# Patient Record
Sex: MALE | Race: BLACK OR AFRICAN AMERICAN | NOT HISPANIC OR LATINO | Employment: FULL TIME | URBAN - METROPOLITAN AREA
[De-identification: names, ages, dates, MRNs, and addresses within clinical notes are randomized per-mention and may not be internally consistent; named-entity substitution may affect disease eponyms.]

---

## 2020-03-10 ENCOUNTER — CONSULT (OUTPATIENT)
Dept: SLEEP MEDICINE | Facility: HOSPITAL | Age: 50
End: 2020-03-10

## 2020-03-10 VITALS
HEIGHT: 76 IN | DIASTOLIC BLOOD PRESSURE: 71 MMHG | SYSTOLIC BLOOD PRESSURE: 128 MMHG | WEIGHT: 287.4 LBS | HEART RATE: 84 BPM | OXYGEN SATURATION: 97 % | BODY MASS INDEX: 35 KG/M2

## 2020-03-10 DIAGNOSIS — E66.01 CLASS 2 SEVERE OBESITY DUE TO EXCESS CALORIES WITH SERIOUS COMORBIDITY AND BODY MASS INDEX (BMI) OF 35.0 TO 35.9 IN ADULT (HCC): ICD-10-CM

## 2020-03-10 DIAGNOSIS — G47.33 OSA (OBSTRUCTIVE SLEEP APNEA): Primary | ICD-10-CM

## 2020-03-10 PROCEDURE — 99203 OFFICE O/P NEW LOW 30 MIN: CPT | Performed by: INTERNAL MEDICINE

## 2020-03-10 RX ORDER — PRAVASTATIN SODIUM 40 MG
TABLET ORAL
COMMUNITY
Start: 2019-02-01

## 2020-03-10 RX ORDER — METFORMIN HYDROCHLORIDE 500 MG/1
TABLET, EXTENDED RELEASE ORAL
COMMUNITY
Start: 2020-02-10

## 2020-03-10 RX ORDER — LOSARTAN POTASSIUM 50 MG/1
TABLET ORAL DAILY
COMMUNITY
Start: 2019-02-01

## 2020-03-12 NOTE — PROGRESS NOTES
Subjective   Nikunj Real is a 49 y.o. male is being seen for consultation today at the request of Christiano Worthy MD further evaluation of snoring and obstructive sleep apnea.    History of Present Illness  Patient had a history of snoring noted since he was in his 20s.  He had a sleep study here in 2014 was diagnosed obstructive sleep apnea.  He has been on CPAP since then.  He says is working fairly well but has some buttons broken off and he is interested in getting a new machine.  He has not had new supplies recently.  He has mask leaks that bother him.  He says generally however when he uses the machine he feels rested.  He denies being sleepy during the day.  He denies having problems driving.    Without his mask he still has loud snoring and tosses and turns at night.  He snores in all positions.  He is sleepy during the day.  He has awaken gasping for breath.  He denies ever breaking his nose.  He denies any reflux symptoms.  He denies hypnagogue hallucinations sleep paralysis.  He kicks occasionally at night but says it does not keep him awake.  He denies having chronic pain that keeps him awake.  He denies any recent changes in his weight.    He goes to bed 8 30-9 p.m.  He will fall asleep in 15 to 20 minutes.  He awakens he thinks about 6 times during the night he gets about 6 and half hours sleep but still feels slightly tired even if he uses his machine.  He has a history of diabetes known for 4 years.  He denies any history of hypertension or coronary artery disease.  No Known Allergies       Current Outpatient Medications:   •  Dulaglutide (TRULICITY) 0.75 MG/0.5ML solution pen-injector, Inject  under the skin into the appropriate area as directed 1 (One) Time Per Week., Disp: , Rfl:   •  losartan (COZAAR) 50 MG tablet, Take  by mouth Daily., Disp: , Rfl:   •  metFORMIN ER (GLUCOPHAGE-XR) 500 MG 24 hr tablet, Take  by mouth., Disp: , Rfl:   •  pravastatin (PRAVACHOL) 40 MG tablet, Take  by mouth.,  "Disp: , Rfl:     Social History    Tobacco Use      Smoking status: Never Smoker      Smokeless tobacco: Never Used       Social History     Substance and Sexual Activity   Alcohol Use Not Currently    Comment: 12 beers a week       Caffeine: He has 2 cups coffee per day    Past Medical History:   Diagnosis Date   • Asthma    • Diabetes mellitus (CMS/Former[facility])        History reviewed. No pertinent surgical history.    Family History   Problem Relation Age of Onset   • Hypertension Mother    • Obesity Mother    Family history is also positive for asthma.    The following portions of the patient's history were reviewed and updated as appropriate: allergies, current medications, past family history, past medical history, past social history, past surgical history and problem list.    Review of Systems   Constitutional: Negative.    HENT: Negative.    Eyes: Positive for visual disturbance.   Respiratory: Positive for wheezing.    Cardiovascular: Negative.    Gastrointestinal: Negative.    Endocrine: Negative.    Genitourinary: Negative.    Musculoskeletal: Negative.    Skin: Negative.    Allergic/Immunologic: Negative.    Neurological: Negative.    Hematological: Negative.    Psychiatric/Behavioral: Negative.    [place] score is 6/24    Objective     /71   Pulse 84   Ht 193 cm (76\")   Wt 130 kg (287 lb 6.4 oz)   SpO2 97%   BMI 34.98 kg/m²      Physical Exam   Constitutional: He is oriented to person, place, and time. He appears well-developed and well-nourished.   He is obese.   HENT:   Head: Normocephalic and atraumatic.   He has Mallampati class III anatomy.   Eyes: Pupils are equal, round, and reactive to light. EOM are normal.   Neck: Normal range of motion. Neck supple.   Cardiovascular: Normal rate, regular rhythm and normal heart sounds.   Pulmonary/Chest: Effort normal and breath sounds normal.   Abdominal: Soft. Bowel sounds are normal.   Musculoskeletal: Normal range of motion. He exhibits no edema. "   Neurological: He is alert and oriented to person, place, and time.   Skin: Skin is warm and dry.   Psychiatric: He has a normal mood and affect. His behavior is normal.   He had a split-night sleep study on November 1, 2014.  He had an AHI of 91.  He titrated CPAP 11.    Download from his machine for the past 47 days shows he is only used it 21% of the time.  He is on an auto set of 12-20.  His AHI is 1.  His 90th percentile pressure is 13.4.    Assessment/Plan   Nikunj was seen today for sleeping problem.    Diagnoses and all orders for this visit:    MISSY (obstructive sleep apnea)  -     CPAP Therapy    Class 2 severe obesity due to excess calories with serious comorbidity and body mass index (BMI) of 35.0 to 35.9 in adult (CMS/Former[facility])    Patient has a history of severe obstructive sleep apnea.  He says his weight is about the same.  We discussed possible therapies including CPAP, weight control, oral appliance, and surgery.  He wishes to continue on CPAP.  We will order a new machine.  And would we will also order new supplies.  We will plan to see him back in 2 months.  We can download the machine make sure it is being effective.  He is encouraged to lose weight.  He is encouraged avoid alcohol and sedatives close to bedtime.  He is encouraged to practice lateral position sleep.         Nikhil Collado MD [facility]  Sleep Medicine  Pulmonary and Critical Care Medicine

## 2020-05-08 ENCOUNTER — HOSPITAL ENCOUNTER (EMERGENCY)
Facility: HOSPITAL | Age: 50
Discharge: HOME | End: 2020-05-08
Attending: EMERGENCY MEDICINE | Admitting: EMERGENCY MEDICINE

## 2020-05-08 ENCOUNTER — APPOINTMENT (OUTPATIENT)
Dept: GENERAL RADIOLOGY | Facility: HOSPITAL | Age: 50
End: 2020-05-08

## 2020-05-08 VITALS
RESPIRATION RATE: 18 BRPM | OXYGEN SATURATION: 97 % | BODY MASS INDEX: 36.04 KG/M2 | HEIGHT: 76 IN | HEART RATE: 78 BPM | TEMPERATURE: 98.1 F | SYSTOLIC BLOOD PRESSURE: 128 MMHG | DIASTOLIC BLOOD PRESSURE: 81 MMHG | WEIGHT: 296 LBS

## 2020-05-08 DIAGNOSIS — R20.2 PARESTHESIA OF LEFT UPPER EXTREMITY: Primary | ICD-10-CM

## 2020-05-08 DIAGNOSIS — Z91.89 MULTIPLE RISK FACTORS FOR CORONARY ARTERY DISEASE: ICD-10-CM

## 2020-05-08 DIAGNOSIS — K04.7 DENTAL ABSCESS: ICD-10-CM

## 2020-05-08 LAB
ALBUMIN SERPL-MCNC: 4.6 G/DL (ref 3.5–5.2)
ALBUMIN/GLOB SERPL: 1.5 G/DL
ALP SERPL-CCNC: 69 U/L (ref 39–117)
ALT SERPL W P-5'-P-CCNC: 26 U/L (ref 1–41)
ANION GAP SERPL CALCULATED.3IONS-SCNC: 14 MMOL/L (ref 5–15)
AST SERPL-CCNC: 24 U/L (ref 1–40)
BASOPHILS # BLD AUTO: 0.03 10*3/MM3 (ref 0–0.2)
BASOPHILS NFR BLD AUTO: 0.4 % (ref 0–1.5)
BILIRUB SERPL-MCNC: 0.4 MG/DL (ref 0.2–1.2)
BUN BLD-MCNC: 12 MG/DL (ref 6–20)
BUN/CREAT SERPL: 13.6 (ref 7–25)
CALCIUM SPEC-SCNC: 9.7 MG/DL (ref 8.6–10.5)
CHLORIDE SERPL-SCNC: 98 MMOL/L (ref 98–107)
CO2 SERPL-SCNC: 25 MMOL/L (ref 22–29)
CREAT BLD-MCNC: 0.88 MG/DL (ref 0.76–1.27)
DEPRECATED RDW RBC AUTO: 43.4 FL (ref 37–54)
EOSINOPHIL # BLD AUTO: 0.01 10*3/MM3 (ref 0–0.4)
EOSINOPHIL NFR BLD AUTO: 0.1 % (ref 0.3–6.2)
ERYTHROCYTE [DISTWIDTH] IN BLOOD BY AUTOMATED COUNT: 13.1 % (ref 12.3–15.4)
GFR SERPL CREATININE-BSD FRML MDRD: 111 ML/MIN/1.73
GLOBULIN UR ELPH-MCNC: 3 GM/DL
GLUCOSE BLD-MCNC: 110 MG/DL (ref 65–99)
HCT VFR BLD AUTO: 48 % (ref 37.5–51)
HGB BLD-MCNC: 15 G/DL (ref 13–17.7)
HOLD SPECIMEN: NORMAL
HOLD SPECIMEN: NORMAL
IMM GRANULOCYTES # BLD AUTO: 0.02 10*3/MM3 (ref 0–0.05)
IMM GRANULOCYTES NFR BLD AUTO: 0.3 % (ref 0–0.5)
LIPASE SERPL-CCNC: 23 U/L (ref 13–60)
LYMPHOCYTES # BLD AUTO: 1.14 10*3/MM3 (ref 0.7–3.1)
LYMPHOCYTES NFR BLD AUTO: 14.4 % (ref 19.6–45.3)
MCH RBC QN AUTO: 28.3 PG (ref 26.6–33)
MCHC RBC AUTO-ENTMCNC: 31.3 G/DL (ref 31.5–35.7)
MCV RBC AUTO: 90.6 FL (ref 79–97)
MONOCYTES # BLD AUTO: 0.39 10*3/MM3 (ref 0.1–0.9)
MONOCYTES NFR BLD AUTO: 4.9 % (ref 5–12)
NEUTROPHILS # BLD AUTO: 6.35 10*3/MM3 (ref 1.7–7)
NEUTROPHILS NFR BLD AUTO: 79.9 % (ref 42.7–76)
NRBC BLD AUTO-RTO: 0 /100 WBC (ref 0–0.2)
NT-PROBNP SERPL-MCNC: 24.5 PG/ML (ref 5–900)
PLATELET # BLD AUTO: 218 10*3/MM3 (ref 140–450)
PMV BLD AUTO: 11.1 FL (ref 6–12)
POTASSIUM BLD-SCNC: 5 MMOL/L (ref 3.5–5.2)
PROT SERPL-MCNC: 7.6 G/DL (ref 6–8.5)
RBC # BLD AUTO: 5.3 10*6/MM3 (ref 4.14–5.8)
SODIUM BLD-SCNC: 137 MMOL/L (ref 136–145)
TROPONIN T SERPL-MCNC: <0.01 NG/ML (ref 0–0.03)
TROPONIN T SERPL-MCNC: <0.01 NG/ML (ref 0–0.03)
WBC NRBC COR # BLD: 7.94 10*3/MM3 (ref 3.4–10.8)
WHOLE BLOOD HOLD SPECIMEN: NORMAL
WHOLE BLOOD HOLD SPECIMEN: NORMAL

## 2020-05-08 PROCEDURE — 99285 EMERGENCY DEPT VISIT HI MDM: CPT

## 2020-05-08 PROCEDURE — 93005 ELECTROCARDIOGRAM TRACING: CPT

## 2020-05-08 PROCEDURE — 84484 ASSAY OF TROPONIN QUANT: CPT | Performed by: EMERGENCY MEDICINE

## 2020-05-08 PROCEDURE — 93005 ELECTROCARDIOGRAM TRACING: CPT | Performed by: EMERGENCY MEDICINE

## 2020-05-08 PROCEDURE — 80053 COMPREHEN METABOLIC PANEL: CPT | Performed by: EMERGENCY MEDICINE

## 2020-05-08 PROCEDURE — 71045 X-RAY EXAM CHEST 1 VIEW: CPT

## 2020-05-08 PROCEDURE — 83690 ASSAY OF LIPASE: CPT | Performed by: EMERGENCY MEDICINE

## 2020-05-08 PROCEDURE — 85025 COMPLETE CBC W/AUTO DIFF WBC: CPT | Performed by: EMERGENCY MEDICINE

## 2020-05-08 PROCEDURE — 83880 ASSAY OF NATRIURETIC PEPTIDE: CPT | Performed by: EMERGENCY MEDICINE

## 2020-05-08 RX ORDER — SODIUM CHLORIDE 0.9 % (FLUSH) 0.9 %
10 SYRINGE (ML) INJECTION AS NEEDED
Status: DISCONTINUED | OUTPATIENT
Start: 2020-05-08 | End: 2020-05-08 | Stop reason: HOSPADM

## 2020-05-08 RX ORDER — AMOXICILLIN 875 MG/1
875 TABLET, COATED ORAL 2 TIMES DAILY
Qty: 20 TABLET | Refills: 0 | Status: SHIPPED | OUTPATIENT
Start: 2020-05-08

## 2020-05-08 RX ORDER — OXYCODONE AND ACETAMINOPHEN 7.5; 325 MG/1; MG/1
1 TABLET ORAL EVERY 6 HOURS PRN
Qty: 8 TABLET | Refills: 0 | Status: SHIPPED | OUTPATIENT
Start: 2020-05-08

## 2020-05-08 NOTE — DISCHARGE INSTRUCTIONS
If you take further ibuprofen make sure you take it on a full stomach.  If you take the pain medicine I have prescribed do not drive or work while doing so as it will make you sleepy.  Return if you develop shortness of breath, pain in your chest, or any other concerns.

## 2020-05-08 NOTE — ED PROVIDER NOTES
Subjective   Mr. Real presents from work with the complaint of right-sided jaw pain and tingling in his left arm.  He tells me that he has had a sore tooth for the last several days on the right lower jaw.  He has been taking 800 mg of ibuprofen intermittently for it.  He tells me the pain is getting worse and he did not sleep any last night.  He did not eat any breakfast and was tired on the way to work so drank coffee on the way to work.  Upon arriving to work he tells me he felt very jittery and became diaphoretic and noticed tingly sensations in his left forearm.  He denies any shortness of breath or pain in his chest.  He has no known cardiac history.  Family history is negative for heart disease in any first-degree relatives.  He does have obstructive sleep apnea but is having trouble with leakage around his mask.  He went to the clinic at [facility] and was referred here for further evaluation.      History provided by:  Patient  Chest Pain   Chest pain location: Right jaw.  Pain quality: aching    Pain radiates to:  Does not radiate  Pain severity:  Moderate (Was severe last night)  Onset quality:  Gradual  Timing:  Constant  Progression:  Worsening  Chronicity:  New  Relieved by: Rinsing his mouth with mouthwash.  Worsened by:  Nothing  Ineffective treatments: 800 mg of ibuprofen.  Associated symptoms: diaphoresis and numbness    Associated symptoms: no abdominal pain, no cough, no dizziness, no fatigue, no fever, no nausea, no palpitations, no shortness of breath, no vomiting and no weakness    Risk factors: diabetes mellitus, high cholesterol and hypertension        Review of Systems   Constitutional: Positive for diaphoresis. Negative for chills, fatigue and fever.   HENT: Positive for dental problem. Negative for congestion and rhinorrhea.    Respiratory: Negative for cough and shortness of breath.    Cardiovascular: Positive for chest pain. Negative for palpitations.   Gastrointestinal: Negative for  abdominal pain, nausea and vomiting.   Neurological: Positive for numbness. Negative for dizziness and weakness.   All other systems reviewed and are negative.      Past Medical History:   Diagnosis Date   • Asthma    • Diabetes mellitus (CMS/HCC)    • Hypertension        No Known Allergies    History reviewed. No pertinent surgical history.    Family History   Problem Relation Age of Onset   • Hypertension Mother    • Obesity Mother        Social History     Socioeconomic History   • Marital status:      Spouse name: Not on file   • Number of children: Not on file   • Years of education: Not on file   • Highest education level: Not on file   Tobacco Use   • Smoking status: Never Smoker   • Smokeless tobacco: Never Used   Substance and Sexual Activity   • Alcohol use: Not Currently     Comment: 12 beers a week   • Drug use: Never   • Sexual activity: Defer           Objective   Physical Exam   Constitutional: He is oriented to person, place, and time. He appears well-developed and well-nourished. No distress.   He is a very pleasant gentleman   HENT:   Mouth/Throat: Oropharynx is clear and moist.   What looks to be tooth #30 has a cap on it and the gumline around that tooth is swollen and tender.  There is no palpable abscess however.   Eyes: Conjunctivae are normal. No scleral icterus.   Neck: Normal range of motion. Neck supple. No JVD present.   Cardiovascular: Normal rate and regular rhythm. Exam reveals no friction rub.   No murmur heard.  Pulmonary/Chest: Effort normal and breath sounds normal. No respiratory distress. He has no wheezes.   Abdominal: Soft. Bowel sounds are normal. He exhibits no distension. There is no guarding.   He is mildly tender in the right upper quadrant   Musculoskeletal: Normal range of motion. He exhibits no edema or tenderness.   Neurological: He is alert and oriented to person, place, and time. Coordination normal.   Skin: Skin is warm and dry. No rash noted. No pallor.    Psychiatric: He has a normal mood and affect. His behavior is normal.   Nursing note and vitals reviewed.      Procedures           ED Course  ED Course as of May 08 1507   Fri May 08, 2020   1339 I spoke with Mr. Real about findings.  And troponin is negative.  He does have a lot of risk factors so will refer him for stress testing and the chest pain clinic.  He tells me his tooth is really bothering him.  We will place him on antibiotics and oxycodone.  I discussed use of narcotic pain medications with him.  He does have a dentist and will call them and arrange close follow-up    [DT]      ED Course User Index  [DT] Sha Viramontes MD                                           MDM  Number of Diagnoses or Management Options  Dental abscess: established and worsening  Multiple risk factors for coronary artery disease:   Paresthesia of left upper extremity: new and requires workup     Amount and/or Complexity of Data Reviewed  Clinical lab tests: reviewed and ordered  Tests in the radiology section of CPT®: ordered and reviewed  Independent visualization of images, tracings, or specimens: yes        Final diagnoses:   Dental abscess   Paresthesia of left upper extremity   Multiple risk factors for coronary artery disease            Sha Viramontes MD  05/08/20 9024

## 2020-05-11 ENCOUNTER — TELEMEDICINE (OUTPATIENT)
Dept: CARDIOLOGY | Facility: HOSPITAL | Age: 50
End: 2020-05-11

## 2020-05-11 VITALS — WEIGHT: 296 LBS | HEIGHT: 76 IN | BODY MASS INDEX: 36.04 KG/M2

## 2020-05-11 DIAGNOSIS — I10 ESSENTIAL HYPERTENSION: Primary | ICD-10-CM

## 2020-05-11 DIAGNOSIS — E11.9 DIABETES MELLITUS WITH NO COMPLICATION (HCC): ICD-10-CM

## 2020-05-11 DIAGNOSIS — E78.5 HYPERLIPIDEMIA, UNSPECIFIED HYPERLIPIDEMIA TYPE: ICD-10-CM

## 2020-05-11 PROCEDURE — 99213 OFFICE O/P EST LOW 20 MIN: CPT | Performed by: NURSE PRACTITIONER

## 2020-05-11 NOTE — PROGRESS NOTES
[facility]  Heart and Valve Center  Telemedicine note    05/11/2020         Nikunj Real  [address] [facility] [place] [address]  [unfilled]    1970    Christiano Worthy MD    Nikunj Real is a 50 y.o. male.      Subjective:     Chief Complaint:  Establish Care (left arm numbness and tingling)   n/t left arm     This was an telephone enabled telemedicine encounter.  Unable to complete visit using a video connection to the patient. A phone visit was used to complete this visits. Total time of discussion was 12 minutes.    HPI 50-year-old male presented to the ED on 5/8/2020 with complaints of right-sided jaw pain and tingling in his left arm.  Patient notes he has had a sore tooth patient reports he had been taking in 100 mg of ibuprofen in the right lower jaw for the last few days.  And the pain continued to worsen.  Patient did not sleep the night prior to ED visit and did not eat breakfast prior to going to work.  Patient drank coffee on the way to work and then noted he experienced jitteriness diaphoresis and tingling in his left forearm.  He denied any shortness of breath or pain in his chest.  He has no known cardiac history.  History of hypertension, hyperlipidemia and diabetes all well controlled on medication.  Family history is negative for heart disease in a first-degree relative.  Does have a history of obstructive sleep apnea and notes leakage around his mask.  He also denies abdominal pain, cough, dizziness, fatigue, presyncope, syncope, orthopnea, PND or pedal edema.  EKGs x2 were normal sinus rhythm, troponin within normal limits.      Patient Active Problem List   Diagnosis   • Diabetes mellitus with no complication (CMS/[facility])   • MISSY (obstructive sleep apnea)   • Class 2 severe obesity due to excess calories with serious comorbidity and body mass index (BMI) of 35.0 to 35.9 in adult (CMS/[facility])       Past Medical History:   Diagnosis Date   • Asthma    • Diabetes mellitus  (CMS/[facility])    • Hypertension        History reviewed. No pertinent surgical history.    Family History   Problem Relation Age of Onset   • Hypertension Mother    • Obesity Mother    • Other Brother         enlarged heart       Social History     Socioeconomic History   • Marital status:      Spouse name: Not on file   • Number of children: Not on file   • Years of education: Not on file   • Highest education level: Not on file   Tobacco Use   • Smoking status: Never Smoker   • Smokeless tobacco: Never Used   Substance and Sexual Activity   • Alcohol use: Not Currently     Comment: 12 beers a week   • Drug use: Never   • Sexual activity: Defer   Social History Narrative    caffeine use: 1 serving of coffee daily       No Known Allergies      Current Outpatient Medications:   •  amoxicillin (AMOXIL) 875 MG tablet, Take 1 tablet by mouth 2 (Two) Times a Day., Disp: 20 tablet, Rfl: 0  •  Dulaglutide (TRULICITY) 0.75 MG/0.5ML solution pen-injector, Inject  under the skin into the appropriate area as directed 1 (One) Time Per Week., Disp: , Rfl:   •  losartan (COZAAR) 50 MG tablet, Take  by mouth Daily., Disp: , Rfl:   •  metFORMIN ER (GLUCOPHAGE-XR) 500 MG 24 hr tablet, Take  by mouth., Disp: , Rfl:   •  oxyCODONE-acetaminophen (PERCOCET) 7.5-325 MG per tablet, Take 1 tablet by mouth Every 6 (Six) Hours As Needed (Pain)., Disp: 8 tablet, Rfl: 0  •  pravastatin (PRAVACHOL) 40 MG tablet, Take  by mouth., Disp: , Rfl:     The following portions of the patient's history were reviewed today and updated as appropriate: allergies, current medications, past family history, past medical history, past social history, past surgical history and problem list     Review of Systems   Constitution: Negative for chills, decreased appetite, diaphoresis, fever, malaise/fatigue, night sweats, weight gain and weight loss.   HENT: Negative for congestion, hearing loss, hoarse voice and nosebleeds.         Right jaw pain  Right tooth  "pain    Eyes: Negative for blurred vision, visual disturbance and visual halos.   Cardiovascular: Negative for chest pain, claudication, cyanosis, dyspnea on exertion, irregular heartbeat, leg swelling, near-syncope, orthopnea, palpitations, paroxysmal nocturnal dyspnea and syncope.   Respiratory: Negative for cough, hemoptysis, shortness of breath, sleep disturbances due to breathing, snoring, sputum production and wheezing.    Hematologic/Lymphatic: Negative for bleeding problem. Does not bruise/bleed easily.   Skin: Negative for dry skin, itching and rash.   Musculoskeletal: Negative for arthritis, falls, joint pain, joint swelling and myalgias.   Gastrointestinal: Negative for bloating, abdominal pain, constipation, diarrhea, flatus, heartburn, hematemesis, hematochezia, melena, nausea and vomiting.   Genitourinary: Negative for dysuria, frequency, hematuria, nocturia and urgency.   Neurological: Negative for excessive daytime sleepiness, dizziness, headaches, light-headedness, loss of balance and weakness.   Psychiatric/Behavioral: Negative for depression. The patient does not have insomnia and is not nervous/anxious.        Objective:     Vitals:    05/11/20 1027   Weight: 134 kg (296 lb)   Height: 193 cm (76\")       Body mass index is 36.03 kg/m².    Physical Exam   Constitutional: He is oriented to person, place, and time.   Pulmonary/Chest: Effort normal.   Neurological: He is alert and oriented to person, place, and time.   Psychiatric: His behavior is normal. Thought content normal.   Nursing note and vitals reviewed.      Lab and Diagnostic Review:  Results for orders placed or performed during the hospital encounter of 05/08/20   Troponin   Result Value Ref Range    Troponin T <0.010 0.000 - 0.030 ng/mL   Comprehensive Metabolic Panel   Result Value Ref Range    Glucose 110 (H) 65 - 99 mg/dL    BUN 12 6 - 20 mg/dL    Creatinine 0.88 0.76 - 1.27 mg/dL    Sodium 137 136 - 145 mmol/L    Potassium 5.0 3.5 - " 5.2 mmol/L    Chloride 98 98 - 107 mmol/L    CO2 25.0 22.0 - 29.0 mmol/L    Calcium 9.7 8.6 - 10.5 mg/dL    Total Protein 7.6 6.0 - 8.5 g/dL    Albumin 4.60 3.50 - 5.20 g/dL    ALT (SGPT) 26 1 - 41 U/L    AST (SGOT) 24 1 - 40 U/L    Alkaline Phosphatase 69 39 - 117 U/L    Total Bilirubin 0.4 0.2 - 1.2 mg/dL    eGFR  African Amer 111 >60 mL/min/1.73    Globulin 3.0 gm/dL    A/G Ratio 1.5 g/dL    BUN/Creatinine Ratio 13.6 7.0 - 25.0    Anion Gap 14.0 5.0 - 15.0 mmol/L   Lipase   Result Value Ref Range    Lipase 23 13 - 60 U/L   BNP   Result Value Ref Range    proBNP 24.5 5.0 - 900.0 pg/mL   CBC Auto Differential   Result Value Ref Range    WBC 7.94 3.40 - 10.80 10*3/mm3    RBC 5.30 4.14 - 5.80 10*6/mm3    Hemoglobin 15.0 13.0 - 17.7 g/dL    Hematocrit 48.0 37.5 - 51.0 %    MCV 90.6 79.0 - 97.0 fL    MCH 28.3 26.6 - 33.0 pg    MCHC 31.3 (L) 31.5 - 35.7 g/dL    RDW 13.1 12.3 - 15.4 %    RDW-SD 43.4 37.0 - 54.0 fl    MPV 11.1 6.0 - 12.0 fL    Platelets 218 140 - 450 10*3/mm3    Neutrophil % 79.9 (H) 42.7 - 76.0 %    Lymphocyte % 14.4 (L) 19.6 - 45.3 %    Monocyte % 4.9 (L) 5.0 - 12.0 %    Eosinophil % 0.1 (L) 0.3 - 6.2 %    Basophil % 0.4 0.0 - 1.5 %    Immature Grans % 0.3 0.0 - 0.5 %    Neutrophils, Absolute 6.35 1.70 - 7.00 10*3/mm3    Lymphocytes, Absolute 1.14 0.70 - 3.10 10*3/mm3    Monocytes, Absolute 0.39 0.10 - 0.90 10*3/mm3    Eosinophils, Absolute 0.01 0.00 - 0.40 10*3/mm3    Basophils, Absolute 0.03 0.00 - 0.20 10*3/mm3    Immature Grans, Absolute 0.02 0.00 - 0.05 10*3/mm3    nRBC 0.0 0.0 - 0.2 /100 WBC   Troponin   Result Value Ref Range    Troponin T <0.010 0.000 - 0.030 ng/mL     EKG NSR x2     Assessment and Plan:   1. Essential hypertension  Well controlled per patient report on losartan  HTN Education provided today including signs and symptoms, medication management, daily blood pressure monitoring. Patient encouraged to call the Heart and Valve center with any abnormal readings.     2. Hyperlipidemia,  unspecified hyperlipidemia type  Statin therapy   3. Diabetes mellitus with no complication (CMS/HCC)  Well controlled on metformin and trulicity     Discussion with patient regarding stress test . Patient would like to defer stress testing at this time due to atypical symptoms. Encouraged patient to call office if he would begin experiencing chest pain/Dyspnea  This visit has been scheduled as a telephone  visit to comply with patient safety concerns in accordance with CDC recommendations. Total time of discussion was 12 minutes.      It has been a pleasure to participate in the care of this patient.  Patient was instructed to call the Heart and Valve Center with any questions, concerns, or worsening symptoms.    *Please note that portions of this note were completed with a voice recognition program. Efforts were made to edit the dictations, but occasionally words are mistranscribed.

## 2024-01-15 ENCOUNTER — TELEPHONE (OUTPATIENT)
Dept: UROLOGY | Facility: CLINIC | Age: 54
End: 2024-01-15
Payer: COMMERCIAL

## 2024-02-27 ENCOUNTER — OFFICE VISIT (OUTPATIENT)
Dept: UROLOGY | Facility: CLINIC | Age: 54
End: 2024-02-27
Payer: COMMERCIAL

## 2024-02-27 VITALS — HEIGHT: 76 IN | BODY MASS INDEX: 33.24 KG/M2 | WEIGHT: 273 LBS | HEART RATE: 64 BPM | OXYGEN SATURATION: 97 %

## 2024-02-27 DIAGNOSIS — Z30.09 UNWANTED FERTILITY: Primary | ICD-10-CM

## 2024-02-27 RX ORDER — TAMSULOSIN HYDROCHLORIDE 0.4 MG/1
1 CAPSULE ORAL DAILY
COMMUNITY

## 2024-02-27 RX ORDER — TIRZEPATIDE 15 MG/.5ML
INJECTION, SOLUTION SUBCUTANEOUS
COMMUNITY
Start: 2024-02-26

## 2024-02-27 RX ORDER — ROSUVASTATIN CALCIUM 40 MG/1
TABLET, COATED ORAL
COMMUNITY
Start: 2024-02-12

## 2024-02-27 NOTE — PROGRESS NOTES
Office Note Vasectomy Consult     Patient Name: Nikunj Real  : 1970   MRN: [id number]     Chief Complaint:  Elective Sterilization.       History of Present Illness: Nikunj Real is a 53 y.o. male who presents to Urology today with the desire for irreversible, elective sterilization.  The patient reports that he is  with biological children.  He reports that he and his spouse have been considering vasectomy.  He denies any lower urinary tract symptoms.  Denies hematuria.  Denies history of urinary tract infection. Denies prior urologic evaluation or instrumentation.      Subjective      Review of Systems: Review of Systems   Genitourinary:  Negative for decreased urine volume, difficulty urinating, dysuria, enuresis, flank pain, frequency, hematuria and urgency.        I have reviewed the ROS documented by my clinical staff, I have updated appropriately and I agree. Ez Farrar MD    Past Medical History:   Past Medical History:   Diagnosis Date    Asthma     Diabetes mellitus     Hypertension        Past Surgical History: History reviewed. No pertinent surgical history.    Family History:   Family History   Problem Relation Age of Onset    Hypertension Mother     Obesity Mother     Other Brother         enlarged heart       Social History:   Social History     Socioeconomic History    Marital status:    Tobacco Use    Smoking status: Never     Passive exposure: Never    Smokeless tobacco: Never   Vaping Use    Vaping Use: Never used   Substance and Sexual Activity    Alcohol use: Not Currently     Comment: 12 beers a week    Drug use: Never    Sexual activity: Defer       Medications:     Current Outpatient Medications:     losartan (COZAAR) 50 MG tablet, Take  by mouth Daily., Disp: , Rfl:     metFORMIN ER (GLUCOPHAGE-XR) 500 MG 24 hr tablet, Take  by mouth., Disp: , Rfl:     Mounjaro 15 MG/0.5ML solution pen-injector pen, , Disp: , Rfl:     rosuvastatin (CRESTOR) 40 MG tablet,  "TAKE 1 TABLET BY MOUTH AT BEDTIME FOR HIGH CHOLESTEROL, Disp: , Rfl:     tamsulosin (FLOMAX) 0.4 MG capsule 24 hr capsule, Take 1 capsule by mouth Daily., Disp: , Rfl:     amoxicillin (AMOXIL) 875 MG tablet, Take 1 tablet by mouth 2 (Two) Times a Day. (Patient not taking: Reported on 2/27/2024), Disp: 20 tablet, Rfl: 0    Dulaglutide (TRULICITY) 0.75 MG/0.5ML solution pen-injector, Inject  under the skin into the appropriate area as directed 1 (One) Time Per Week. (Patient not taking: Reported on 2/27/2024), Disp: , Rfl:     oxyCODONE-acetaminophen (PERCOCET) 7.5-325 MG per tablet, Take 1 tablet by mouth Every 6 (Six) Hours As Needed (Pain). (Patient not taking: Reported on 2/27/2024), Disp: 8 tablet, Rfl: 0    pravastatin (PRAVACHOL) 40 MG tablet, Take  by mouth. (Patient not taking: Reported on 2/27/2024), Disp: , Rfl:     Allergies:   No Known Allergies      Objective     Physical Exam:   Vital Signs:   Vitals:    02/27/24 1507   Pulse: 64   SpO2: 97%   Weight: 124 kg (273 lb)   Height: 193 cm (76\")   PainSc: 0-No pain     Body mass index is 33.23 kg/m².     Physical Exam  Vitals and nursing note reviewed.   Constitutional:       Appearance: Normal appearance.   HENT:      Head: Normocephalic and atraumatic.   Cardiovascular:      Comments: Well perfused  Pulmonary:      Effort: Pulmonary effort is normal.   Abdominal:      General: Abdomen is flat.      Palpations: Abdomen is soft.   Musculoskeletal:         General: Normal range of motion.   Skin:     General: Skin is warm and dry.   Neurological:      General: No focal deficit present.      Mental Status: He is alert and oriented to person, place, and time. Mental status is at baseline.   Psychiatric:         Mood and Affect: Mood normal.         Behavior: Behavior normal.         Thought Content: Thought content normal.         Judgment: Judgment normal.         Genitourinary  Penis: uncircumcised penis, glans normal, no penile discharge.  No rashes/lesions.  "   Testes: descended bilaterally, no masses, nontender to palpation.  Bilateral vas deferens palpable. Remainder of scrotal contents normal. No hernia appreciated.      Labs:   Brief Urine Lab Results       None                 Lab Results   Component Value Date    GLUCOSE 110 (H) 05/08/2020    CALCIUM 9.7 05/08/2020     05/08/2020    K 5.0 05/08/2020    CO2 25.0 05/08/2020    CL 98 05/08/2020    BUN 12 05/08/2020    CREATININE 0.88 05/08/2020    EGFRIFAFRI 111 05/08/2020    BCR 13.6 05/08/2020    ANIONGAP 14.0 05/08/2020       Lab Results   Component Value Date    WBC 7.94 05/08/2020    HGB 15.0 05/08/2020    HCT 48.0 05/08/2020    MCV 90.6 05/08/2020     05/08/2020       Images:   No Images in the past 120 days found..    Measures:   Tobacco:   Nikunj Real  reports that he has never smoked. He has never been exposed to tobacco smoke. He has never used smokeless tobacco.. I have educated him on the risk of diseases from using tobacco products.    Assessment / Plan      Assessment/Plan:   Mr. Real is a 53 y.o. male who presents today requesting permanent, irreversible surgical sterilization.     Diagnoses and all orders for this visit:    1. Unwanted fertility (Primary)  -     External Facility Surgical/Procedural Request; Future         Patient Education:   Today we discussed the risks and benefits of irreversible and permanent surgical sterilization.  The vasectomy procedure was discussed in detail with the patient. Risks including failure of the procedure, infection, bleeding, development of chronic testicular pain, and the possibility of injuring adjacent structures which could potentially result in loss of the testicle were discussed in depth..  Furthermore, the patient was told he would remain fertile following the procedure until he provided a semen analysis after 12 weeks and 20 ejaculations post-procedure. Discussed that semen analysis will be reviewed and he will be contacted with results.  The patient is understanding that any unprotected intercourse can result in pregnancy until he provides a semen analysis at above interval.  He is understanding that he requires birth control method until his semen analysis is performed and completed.The patient expressed understanding and desire to proceed.  He will be scheduled for vasectomy at his convenience.       I spent approximately 30 minutes providing clinical care for this patient; including review of patient's chart and provider documentation, face to face time spent with patient in examination room (obtaining history, performing physical exam, discussing diagnosis and management options), placing orders, and completing patient documentation.     Ez Farrar MD  [facility] [place]

## 2024-04-16 ENCOUNTER — LAB REQUISITION (OUTPATIENT)
Dept: LAB | Facility: HOSPITAL | Age: 54
End: 2024-04-16

## 2024-04-16 ENCOUNTER — DOCUMENTATION (OUTPATIENT)
Dept: UROLOGY | Facility: CLINIC | Age: 54
End: 2024-04-16

## 2024-04-16 ENCOUNTER — OUTSIDE FACILITY SERVICE (OUTPATIENT)
Dept: UROLOGY | Facility: CLINIC | Age: 54
End: 2024-04-16
Payer: COMMERCIAL

## 2024-04-16 DIAGNOSIS — Z30.09 ENCOUNTER FOR OTHER GENERAL COUNSELING AND ADVICE ON CONTRACEPTION: ICD-10-CM

## 2024-04-16 PROCEDURE — 88302 TISSUE EXAM BY PATHOLOGIST: CPT | Performed by: UROLOGY

## 2024-04-16 NOTE — PROGRESS NOTES
[facility]         OPERATIVE REPORT - VASECTOMY    Patient Name:  Nikunj Real  YOB: 1970    Patient MRN: [id number]    Date of Surgery: 4/16/24      Indications:  53 yo M who desires elective sterilization presents for bilateral vasectomy after discussion of risks and benefits. Informed consent reviewed and signed.      Pre-op Diagnosis:   Encounter for Sterilization     Post-op Diagnosis:   Encounter for Sterilization     Procedure Performed: Bilateral Vasectomy    Procedure/CPT® Codes: 97381     Staff:  Ez Farrar MD    Anesthesia: General    Estimated Blood Loss: Minimal    Implants:  None    Specimen:  Bilateral vas deferens    Drains: None      Findings:  - Uncomplicated bilateral vasectomy  - 1 cm segments of the bilateral vas deferens sent to pathology     Description of Procedure:   The patient was identified and informed consent was reviewed and signed.  He was placed in the supine position.  His genitals were prepped and draped in sterile fashion.  The right vas deferens was isolated. Local anesthetic was injected at the skin and deep to the dartos tissue.    After anesthesia confirmed, an 11 blade scalpel was used to make a small skin incision in the right lateral hemiscrotum.   A ring clamp was used to isolate the vas deferens and pull this out through the skin. Bovie cautery to cauterize  perivasal vessels.  The right vas deferens was skeletonized over an area of 1-1/2 cm.  2 hemostat clamps were used to clamp the testicular and abdominal ends of the vas deferens.  Between the area of the hemostat a 1 cm segment of the vas was transected.  Electrocautery was used to cauterize the ends of the vas deferens. The right sided segment was sent off to pathology.  Using a clip applier a clip was placed on the abdominal end of the vas deferens.  Once hemostasis was confirmed the vas deferens and tissue was delivered back into the scrotum. A 4-0 chromic suture was  used to sew a horizontal mattress stitch at the skin incision.  Hemostasis was confirmed.      And identical procedure was performed on the left side.  The left-sided vas deferens segment was sent off to pathology.  The skin was closed with a horizontal mattress 4-0 chromic suture again. Neosporin was applied over the incisions and scrotal fluff gauze were placed.  The patient tolerated the procedure well.    Complications: None     Follow Up: Patient will complete 20 ejaculations and 12 weeks before he provides us a semen analysis.  He was sent home with bacitracin to apply over his incision for 7 days.  Return precautions discussed at length.  The patient is understanding that any unprotected intercourse can result in pregnancy until he provides a semen analysis at above interval.  He is understanding that he requires birth control method until his semen analysis is performed and completed.  He expresses understanding and agreement with this.  Will be contacted once his semen analysis performed.    Ez Farrar MD     Date: 4/16/2024  Time: 13:20 EDT

## 2024-04-17 LAB
CYTO UR: NORMAL
LAB AP CASE REPORT: NORMAL
LAB AP CLINICAL INFORMATION: NORMAL
PATH REPORT.FINAL DX SPEC: NORMAL
PATH REPORT.GROSS SPEC: NORMAL